# Patient Record
Sex: FEMALE | Race: AMERICAN INDIAN OR ALASKA NATIVE
[De-identification: names, ages, dates, MRNs, and addresses within clinical notes are randomized per-mention and may not be internally consistent; named-entity substitution may affect disease eponyms.]

---

## 2017-07-18 NOTE — ED ELOPEMENT REVIEW
ED Pt Elopement review





- Results review


Lab results: 





 Laboratory Tests











  07/16/17 07/16/17 07/16/17





  17:19 17:19 17:20


 


WBC  8.1  


 


RBC  3.51 L  


 


Hgb  11.9  


 


Hct  34.4  


 


MCV  98 H  


 


MCH  34 H  


 


MCHC  35 H  


 


RDW  14.1  


 


Plt Count  203  


 


Lymph % (Auto)  29.4  


 


Mono % (Auto)  10.2 H  


 


Eos % (Auto)  1.7  


 


Baso % (Auto)  0.3  


 


Lymph #  2.4  


 


Mono #  0.8  


 


Eos #  0.1  


 


Baso #  0.0  


 


Seg Neutrophils %  58.4  


 


Seg Neutrophils #  4.7  


 


HCG, Quant   61394 H 


 


Urine Color   


 


Urine Turbidity   


 


Urine pH   


 


Ur Specific Gravity   


 


Urine Protein   


 


Urine Glucose (UA)   


 


Urine Ketones   


 


Urine Blood   


 


Urine Nitrite   


 


Urine Bilirubin   


 


Urine Urobilinogen   


 


Ur Leukocyte Esterase   


 


Urine WBC (Auto)   


 


Urine RBC (Auto)   


 


Blood Type    A POSITIVE


 


Antibody Screen    TNR


 


KISHA Antibody Screen    Negative














  07/16/17





  17:49


 


WBC 


 


RBC 


 


Hgb 


 


Hct 


 


MCV 


 


MCH 


 


MCHC 


 


RDW 


 


Plt Count 


 


Lymph % (Auto) 


 


Mono % (Auto) 


 


Eos % (Auto) 


 


Baso % (Auto) 


 


Lymph # 


 


Mono # 


 


Eos # 


 


Baso # 


 


Seg Neutrophils % 


 


Seg Neutrophils # 


 


HCG, Quant 


 


Urine Color  Yellow


 


Urine Turbidity  Clear


 


Urine pH  5.0


 


Ur Specific Gravity  1.024


 


Urine Protein  <15 mg/dl


 


Urine Glucose (UA)  Neg


 


Urine Ketones  Neg


 


Urine Blood  Neg


 


Urine Nitrite  Neg


 


Urine Bilirubin  Neg


 


Urine Urobilinogen  < 2.0


 


Ur Leukocyte Esterase  Neg


 


Urine WBC (Auto)  0.0


 


Urine RBC (Auto)  0.0


 


Blood Type 


 


Antibody Screen 


 


KISHA Antibody Screen 














- Call Back decision


Pt Call Back Decision: Call pt to return to ED ASAP (pregnancy, vaginal bleeding

, hypotension and abdominal pain should be further evaluated.  Consider pelvic 

ultrasound.)

## 2017-08-10 NOTE — ULTRASOUND REPORT
COMPLETE OB ULTRASOUND:



Gestation: Hernandez



Fetal Position: Breech



Amniotic Fluid: Normal for gestation

  

Placenta: Anterior

  Placental Grade: 0



Fetal Heart Rate:

  149 BPM



Cervical length: 2.2 cm (Normal > 3 cm)



X It is too early for a fetal anatomical survey



BPD: 3.9 cm = 17 w 5 d



 HC: 14.7 cm = 17 w 6 d



 AC: 13.3 cm = 18 w 5 d



 FL: 2.5 cm = 17 w 3 d



HC/AC Ratio: 1.11



Cephalic Index: 84.6



Estimated Fetal Weight: 223 grams



Clinical age = 18 w 2 d      EDC: 1/9/18



US Gest. Age = 18 w 0 d      EDC: 1/11/18

## 2017-08-10 NOTE — EMERGENCY DEPARTMENT REPORT
HPI





- General


Chief Complaint: Vaginal Bleeding


Time Seen by Provider: 08/10/17 12:40





- HPI


HPI: 


This is a 22-year-old  female presents to the emergency 

department with complaint of a small amount of vaginal bleeding/spotting that 

began last night while the patient is pregnant at about 18 weeks.  She is .  She has an OB/GYN but cannot member the name at this time.  She is taking 

prenatal vitamins.  She has some intermittent lower abdominal cramping or 

discomfort.  She also complains of increased fatigue.  She denies any chest pain

, shortness breath, fever, dysuria.  She has been taking Tylenol for her 

symptoms without any relief.  No recent travel or sick contacts at home.  She 

has a past psychiatric history of bipolar disorder and depression.








ED Past Medical Hx





- Past Medical History


Hx Psychiatric Treatment: Yes (bipolar, depression)





- Surgical History


Additional Surgical History: D & C





- Social History


Smoking Status: Former Smoker


Substance Use Type: None





- Medications


Home Medications: 


 Home Medications











 Medication  Instructions  Recorded  Confirmed  Last Taken  Type


 


No Known Home Medications [No  03/03/16 08/10/17 Unknown History





Reported Home Medications]     














ED Review of Systems


ROS: 


Stated complaint: PREG/4MONTH/FEELING SICK/SPOTTING


Other details as noted in HPI





Comment: All other systems reviewed and negative


Constitutional: other (fatigue).  denies: chills, fever


Eyes: denies: eye pain, eye discharge, vision change


ENT: denies: ear pain, throat pain


Respiratory: denies: cough, shortness of breath, wheezing


Cardiovascular: denies: chest pain, palpitations


Gastrointestinal: abdominal pain, nausea, vomiting (this morning)


Genitourinary: other (vaginal bleeding).  denies: dysuria


Musculoskeletal: denies: back pain, joint swelling, arthralgia


Skin: denies: rash, lesions


Neurological: denies: weakness, numbness, paresthesias





Physical Exam





- Physical Exam


Vital Signs: 


 Vital Signs











  08/10/17 08/10/17 08/10/17





  10:48 12:52 13:14


 


Temperature 97.8 F  


 


Pulse Rate 79 78 


 


Respiratory 17 16 18





Rate   


 


Blood Pressure 117/64  


 


Blood Pressure  104/62 





[Right]   


 


O2 Sat by Pulse 100 100 





Oximetry   











Physical Exam: 





GENERAL: The patient is well-developed well-nourished.


HEENT: Normocephalic.  Atraumatic.  Extraocular motions are intact.  Patient 

has moist mucous membranes.  Pupils equal reactive to light bilaterally.


NECK: Supple.  Trachea is mid line.


CHEST/LUNGS: Clear to auscultation.  There is no respiratory distress noted.


HEART/CARDIOVASCULAR: Regular.  There is no tachycardia.  There is no gallop 

rub or murmur.


ABDOMEN: Abdomen is soft, nontender.  Patient has normal bowel sounds.  There 

is no abdominal distention.  Gravid uterus is palpable just below the umbilicus.


SKIN: Skin is warm and dry.


NEURO: The patient is awake, alert, and oriented.  The patient is cooperative.  

The patient has no focal neurologic deficits.  The patient has normal speech.


MUSCULOSKELETAL: There is no tenderness or deformity.  There is no limitation 

range of motion.  There is no evidence of acute injury.





ED Course


 Vital Signs











  08/10/17 08/10/17 08/10/17





  10:48 12:52 13:14


 


Temperature 97.8 F  


 


Pulse Rate 79 78 


 


Respiratory 17 16 18





Rate   


 


Blood Pressure 117/64  


 


Blood Pressure  104/62 





[Right]   


 


O2 Sat by Pulse 100 100 





Oximetry   














ED Medical Decision Making





- Lab Data


Result diagrams: 


 08/10/17 11:29





 08/10/17 14:56





- EKG Data


-: EKG Interpreted by Me


EKG shows normal: sinus rhythm, axis, intervals, QRS complexes, ST-T waves


Rate: normal





- EKG Data


When compared to previous EKG there are: previous EKG unavailable


Interpretation: normal EKG





- Radiology Data


Radiology results: report reviewed


Live intrauterine pregnancy at about 18 weeks on obstetric ultrasound.








- Medical Decision Making


22-year-old female presents to the emergency department with complaint of some 

light vaginal spotting, lower abdominal cramping and some just generalized 

fatigue.  Ultrasound shows live intrauterine pregnancy at about 18 weeks.  

Patient understands the diagnosis of threatened miscarriage and the importance 

of following up with her primary care physician and OB/GYN.  This her labs are 

unremarkable do not show any etiology of the patient's increased fatigue.  EKG 

is normal without ST elevation MI, ischemia or dysrhythmia.  Vital signs stable 

throughout her ED course.








- Differential Diagnosis


pregnancy, threatened miscarriage, spontaneous miscarriage, anemia


Critical Care Time: No


Critical care attestation.: 


If time is entered above; I have spent that time in minutes in the direct care 

of this critically ill patient, excluding procedure time.








ED Disposition


Clinical Impression: 


 Threatened , Hypokalemia





Pregnancy


Qualifiers:


 Weeks of gestation: 18 weeks Qualified Code(s): Z3A.18 - 18 weeks gestation of 

pregnancy





Disposition: DC-01 TO HOME OR SELFCARE


Is pt being admited?: No


Condition: Stable


Instructions:  Threatened Miscarriage (ED), Pregnancy (ED)


Additional Instructions: 


Please follow-up with your primary care physician and OB/GYN in the next few 

days.  Return to the emergency department with any worsening of your vaginal 

bleeding, abdominal pain, or any acute distress.


Referrals: 


PRIMARY CARE,MD [Primary Care Provider] - 3-5 Days


Time of Disposition: 16:41

## 2017-12-05 NOTE — ULTRASOUND REPORT
FINAL REPORT



PROCEDURE:  US OB FETAL BPP WO NON-STRESS



TECHNIQUE:  Sonographic evaluation for fetal breathing, fetal

movement, fetal tone, and amniotic fluid volume was performed.

CPT 38205



HISTORY:  decreased fetal movement 



COMPARISON:  No prior studies are available for comparison.



FINDINGS:  

Single live intrauterine pregnancy is seen with heart rate of 153

beats per minute. 



Amniotic fluid volume: Normal-score 2. At least one vertical

pocket &gt; 2 cm or more in vertical axis.



Fetal breathing: Normal-score 2.



Fetal movement: Normal-score 2.



Fetal tone: Normal.



Score: 8 of 8.



IMPRESSION:  

Normal biophysical profile.

## 2017-12-08 NOTE — HISTORY AND PHYSICAL REPORT
History of Present Illness


Date of examination: 17


Chief complaint: 





sent from [facility] for delivery


History of present illness: 





Pt is a 22 year old -American female primigravida MATY 18 at 35w3d 

presents for induction of labor secondary to IUGR (2nd%ile) and mild 

preeclampsia at greater than 34 weeks per [facility] recommendation. She reports 

decreased fetal movement, but denies vaginal bleeding or leakage of fluid. She 

has had prenatal care at [facility] since transfer into care at 22 

wks complicated by recently diagnosed IUGR and repeat complaints of decreased 

fetal movement. She is GBS unknown.  





Past History


Past Medical History: no pertinent history


Past Surgical History: no surgical history


Family/Genetic History: none, heart disease, hypertension, cancer


Social history: no significant social history, single





- Obstetrical History


Expected Date of Delivery: 18


Actual Gestation: 35 Week(s) 3 Day(s) 


: 1





Medications and Allergies


 Allergies











Allergy/AdvReac Type Severity Reaction Status Date / Time


 


No Known Allergies Allergy   Verified 08/10/17 10:57











 Home Medications











 Medication  Instructions  Recorded  Confirmed  Last Taken  Type


 


Pnv,Calcium 72/Iron/Folic Acid 1 each PO DAILY 17 09:00 

History





[Preplus Ca-Fe 27 mg-FA 1 mg Tb]    1 














Review of Systems


All systems: negative





- Vital Signs


Vital signs: 


 Vital Signs











Temp Resp


 


 98.4 F   18 


 


 17 15:00  17 15:00








 











Temp Pulse Resp BP Pulse Ox


 


 98.4 F   73   18   164/103    


 


 17 15:00  17 16:35  17 15:00  17 16:35   














- Physical Exam


Breasts: Positive: deferred


Cardiovascular: Regular rate


Lungs: Positive: Clear to auscultation


Abdomen: Positive: soft (obese, gravid )


Uterus: Positive: enlarged (gravid )


Extremities: Positive: edema (+1)





- Obstetrical


FHR: auscultation normal


Uterine Contraction Monitor Mode: External


Cervical Dilatation: 1 (per RN )


Uterine Contraction Pattern: Absent


Uterine Tone Measurement Phase: Resting





Results


Result Diagrams: 


 17 15:05





 17 15:05


 Abnormal lab results











  17 Range/Units





  15:05 15:05 15:05 


 


RBC  3.22 L    (3.65-5.03)  M/mm3


 


RDW  15.6 H    (13.2-15.2)  %


 


Lactate Dehydrogenase    250 H  ()  units/L


 


Urine WBC (Auto)   26.0 H   (0.0-6.0)  /HPF


 


U Epithel Cells (Auto)   34.0 H   (0-13.0)  /HPF








All other labs normal.








Assessment and Plan


A: IUP at 35w3d


     Mild Preeclampsia 


     IUGR (2nd percentile)


     GBS unknown





P: Per MFM recommendation, admit for induction of labor.


    PIH labs 


    Closely monitor maternal and fetal status.

## 2017-12-10 NOTE — ANESTHESIA CONSULTATION
Anesthesia Consult and Med Hx


Date of service: 12/10/17





- Airway


Anesthetic Teeth Evaluation: Good


ROM Head & Neck: Adequate


Mental/Hyoid Distance: Adequate


Mallampati Class: Class II


Intubation Access Assessment: Probably Good





- Pulmonary Exam


CTA: Yes





- Cardiac Exam


Cardiac Exam: RRR





- Pre-Operative Health Status


ASA Pre-Surgery Classification: ASA2


Proposed Anesthetic Plan: Epidural





- Pulmonary


Hx Asthma: No


COPD: No


Hx Pneumonia: No





- Cardiovascular System


Hx Hypertension: No





- Central Nervous System


Hx Seizures: No


Hx Psychiatric Problems: No





- Endocrine


Hx Renal Disease: No


Hx End Stage Renal Disease: No


Hx Hypothyroidism: No


Hx Hyperthyroidism: No





- Hematic


Hx Anemia: No


Hx Sickle Cell Disease: No





- Other Systems


Hx Alcohol Use: No

## 2017-12-10 NOTE — PROGRESS NOTE
Assessment and Plan





IUp at 35 weeks for induction. will try cytotec for ripening after patient 

showers and eats.





Subjective





- Subjective


Date of service: 12/09/17


Interval history: 





IUGR at 35 weeks. Patient had cervidil overnight with no change.


Patient reports: fetal movement normal





Objective





- Vital Signs


Vital Signs: 


 Vital Signs - 12hr











  12/10/17 12/10/17 12/10/17





  03:53 03:56 08:25


 


Temperature 98.1 F  98.5 F


 


Pulse Rate 67 62 68


 


Respiratory 20  14





Rate   


 


Blood Pressure  135/67 


 


Blood Pressure 135/67  157/85





[Right]   


 


O2 Sat by Pulse   





Oximetry   














  12/10/17 12/10/17 12/10/17





  08:27 08:29 09:07


 


Temperature   


 


Pulse Rate 66 65 73


 


Respiratory   





Rate   


 


Blood Pressure 145/84 157/85 152/87


 


Blood Pressure   





[Right]   


 


O2 Sat by Pulse   





Oximetry   














  12/10/17 12/10/17 12/10/17





  09:21 09:36 09:40


 


Temperature   


 


Pulse Rate 72 68 


 


Respiratory   18





Rate   


 


Blood Pressure 150/82 161/82 


 


Blood Pressure   





[Right]   


 


O2 Sat by Pulse   





Oximetry   














  12/10/17 12/10/17 12/10/17





  09:51 10:06 11:14


 


Temperature   


 


Pulse Rate 68 74 74


 


Respiratory   





Rate   


 


Blood Pressure 147/65 168/95 159/78


 


Blood Pressure   





[Right]   


 


O2 Sat by Pulse   





Oximetry   














  12/10/17 12/10/17 12/10/17





  11:21 11:36 11:51


 


Temperature   


 


Pulse Rate 83 72 76


 


Respiratory   





Rate   


 


Blood Pressure 151/89 153/75 161/89


 


Blood Pressure   





[Right]   


 


O2 Sat by Pulse   





Oximetry   














  12/10/17 12/10/17 12/10/17





  12:06 13:44 13:45


 


Temperature   


 


Pulse Rate 68 124 H 122 H


 


Respiratory   





Rate   


 


Blood Pressure 156/84  135/99


 


Blood Pressure   





[Right]   


 


O2 Sat by Pulse  96 





Oximetry   














  12/10/17 12/10/17 12/10/17





  13:49 13:52 13:54


 


Temperature   


 


Pulse Rate 92 H 95 H 88


 


Respiratory   





Rate   


 


Blood Pressure  137/68 145/78


 


Blood Pressure   





[Right]   


 


O2 Sat by Pulse 98  96





Oximetry   














  12/10/17 12/10/17 12/10/17





  13:59 14:04 14:20


 


Temperature   


 


Pulse Rate 92 H 101 H 109 H


 


Respiratory   





Rate   


 


Blood Pressure   


 


Blood Pressure   





[Right]   


 


O2 Sat by Pulse 97 97 98





Oximetry   














  12/10/17 12/10/17 12/10/17





  14:21 14:25 14:30


 


Temperature   


 


Pulse Rate 93 H 77 82


 


Respiratory   





Rate   


 


Blood Pressure 136/70  


 


Blood Pressure   





[Right]   


 


O2 Sat by Pulse  98 96





Oximetry   














  12/10/17 12/10/17 12/10/17





  14:35 14:36 14:40


 


Temperature   


 


Pulse Rate 95 H 94 H 83


 


Respiratory   





Rate   


 


Blood Pressure  122/67 


 


Blood Pressure   





[Right]   


 


O2 Sat by Pulse 97  97





Oximetry   














  12/10/17 12/10/17 12/10/17





  14:45 14:50 14:51


 


Temperature   


 


Pulse Rate 82 97 H 83


 


Respiratory   





Rate   


 


Blood Pressure   172/86


 


Blood Pressure   





[Right]   


 


O2 Sat by Pulse 97 96 





Oximetry   














  12/10/17





  14:53


 


Temperature 


 


Pulse Rate 93 H


 


Respiratory 





Rate 


 


Blood Pressure 


 


Blood Pressure 





[Right] 


 


O2 Sat by Pulse 93





Oximetry 














- Exam


Breasts: deferred


Cardiovascular: Regular rate, Normal S1


Lungs: Clear to auscultation, Normal air movement


Abdomen: Present: normal appearance, normal bowel sounds


Cervical Dilatation: 0.5


Cervical Effacement Percentage: 40


Fetal station: -3


Uterine Contraction Pattern: Absent





- Labs


Labs: 


 Abnormal Labs











  12/08/17 12/08/17 12/08/17





  15:05 15:05 15:05


 


RBC  3.22 L  


 


RDW  15.6 H  


 


Lactate Dehydrogenase    250 H


 


Urine WBC (Auto)   26.0 H 


 


U Epithel Cells (Auto)   34.0 H

## 2017-12-11 NOTE — PROGRESS NOTE
Assessment and Plan





A/P PPD#1 s/p 


baby in [facility] 


patient ambultating well


10.3-9.2


A+ no rhogam indicated 


/80s send PI work up 


UTI will treat with keflex 





Subjective





- Subjective


Date of service: 17


Principal diagnosis: 


Patient reports: appetite normal, voiding normally, pain well controlled, flatus

, ambulating normally


[place]: doing well, in NICU





Objective





- Vital Signs


Latest vital signs: 


 Vital Signs











  Temp Pulse Resp BP BP Pulse Ox


 


 17 04:22    18   


 


 12/10/17 23:17    18   


 


 12/10/17 23:15  97.5 F L  73  20   143/67 


 


 12/10/17 17:15  98.4 F  88    148/84 


 


 12/10/17 16:21   77   135/77  


 


 12/10/17 16:06   82   127/83  


 


 12/10/17 15:51   78   147/77  


 


 12/10/17 15:35   91 H   143/84  


 


 12/10/17 15:31   92 H   141/80  


 


 12/10/17 15:22   89     97


 


 12/10/17 15:17   98 H     98


 


 12/10/17 15:00       87


 


 12/10/17 14:55   109 H     98


 


 12/10/17 14:53   93 H     93


 


 12/10/17 14:51   83   172/86  


 


 12/10/17 14:50   97 H     96


 


 12/10/17 14:45   82     97


 


 12/10/17 14:40   83     97


 


 12/10/17 14:36   94 H   122/67  


 


 12/10/17 14:35   95 H     97


 


 12/10/17 14:30   82     96


 


 12/10/17 14:25   77     98


 


 12/10/17 14:21   93 H   136/70  


 


 12/10/17 14:20   109 H     98


 


 12/10/17 14:04   101 H     97


 


 12/10/17 13:59   92 H     97


 


 12/10/17 13:54   88   145/78   96


 


 12/10/17 13:52   95 H   137/68  


 


 12/10/17 13:49   92 H     98


 


 12/10/17 13:45   122 H   135/99  


 


 12/10/17 13:44   124 H     96


 


 12/10/17 12:06   68   156/84  


 


 12/10/17 11:51   76   161/89  


 


 12/10/17 11:36   72   153/75  


 


 12/10/17 11:21   83   151/89  


 


 12/10/17 11:14   74   159/78  


 


 12/10/17 10:06   74   168/95  


 


 12/10/17 09:51   68   147/65  


 


 12/10/17 09:40    18   


 


 12/10/17 09:36   68   161/82  


 


 12/10/17 09:21   72   150/82  


 


 12/10/17 09:07   73   152/87  








 Intake and Output











 12/10/17 12/11/17 12/11/17





 23:59 07:59 15:59


 


Intake Total 400 440 


 


Output Total 500 1500 


 


Balance -100 -1060 


 


Intake:   


 


  Oral 400 440 


 


Output:   


 


  Urine 500 1500 


 


    Void 500 1500 


 


Other:   


 


  Total, Intake Amount 200 240 


 


  Total, Output Amount 500 600 


 


  # Voids   


 


    Void 1  














- Exam


Breasts: Present: normal


Cardiovascular: Present: Regular rate, Normal S1


Lungs: Present: Clear to auscultation, Normal air movement


Abdomen: Present: normal appearance, soft, normal bowel sounds.  Absent: 

distention, tenderness, guarding


Vulva: both: normal


Uterus: Present: normal, firm, fundal height below umbilicus.  Absent: bogginess

, tenderness


Extremities: Present: normal


Deep Tendon Reflex Grade: Normal +2





- Labs


Labs: 


 Abnormal lab results











  17 Range/Units





  05:17 


 


Hgb  9.2 L  (10.1-14.3)  gm/dl


 


Hct  27.2 L  (30.3-42.9)  %

## 2017-12-11 NOTE — EVENT NOTE
Date: 12/11/17





Patient was examined and labs reviewed. it appears patient had UTI will try 

keflex and BP still elevated. c/o headache will intitiate mag 4g loading and 

2gm will check levels Q6hrs. patient agrees with plan

## 2017-12-12 NOTE — PROGRESS NOTE
Assessment and Plan





- Patient Problems


(1) Elevated blood pressure reading


Current Visit: Yes   Status: Acute   


Plan to address problem: 


monitor symptoms today


consider discharge home later today if improved








Subjective





- Subjective


Date of service: 17


Principal diagnosis: 


Interval history: 


Patient has completed 24 hours magnesium for elevated blood pressures. She 

complains of a headache today. Blood pressures normotensive. 





Patient reports: appetite normal, voiding normally


[place]: in NICU





Objective





- Vital Signs


Latest vital signs: 


 Vital Signs











  Temp Pulse Resp BP BP Pulse Ox


 


 17 06:00  98.6 F  71  16   122/77 


 


 17 05:10    18   


 


 17 04:00  98.6 F  72  16   121/78 


 


 17 02:00  98.7 F  69  16   131/79 


 


 17 00:46    18   


 


 17 23:37    18   


 


 17 23:15  98.6 F  71  16   120/75 


 


 17 22:37    18   


 


 17 22:24   69   130/81  


 


 17 20:00  98.6 F  69  16   130/81 


 


 17 18:00  97.9 F  81  20   140/84  95


 


 17 16:05  98.1 F  86  20  157/103  157/103 


 


 17 14:02  97.6 F  84  20   166/100  96


 


 17 12:02  97.6 F  75  18   165/102 


 


 17 10:30   100 H    155/87 


 


 17 10:05  98.2 F  98 H    160/100 


 


 17 10:00  98.4 F  98 H    162/100 


 


 17 09:55  98.2 F  99 H    160/100 


 


 17 09:50  98.4 F  100 H    160/106 


 


 17 09:45  98.1 F  90    166/103 


 


 17 09:40  98.2 F  90    152/86 








 Intake and Output











 17





 22:59 06:59 14:59


 


Intake Total 780  


 


Output Total 4100 3400 


 


Balance -3320 -3400 


 


Intake:   


 


  Oral 480  


 


  Intake, Free Water 300  


 


Output:   


 


  Urine 4100 3400 


 


    Indwelling Catheter 4100 3400 


 


Other:   


 


  Total, Intake Amount 480  


 


  Total, Output Amount 900 1400 


 


  Voiding Method Indwelling Catheter  














- Exam


Abdomen: Present: normal appearance


Uterus: Present: normal, firm





- Labs


Labs: 


 Abnormal lab results











  17 Range/Units





  14:39 14:39 14:39 


 


RBC  2.99 L    (3.65-5.03)  M/mm3


 


Hgb  9.7 L    (10.1-14.3)  gm/dl


 


Hct  28.3 L    (30.3-42.9)  %


 


RDW  15.5 H    (13.2-15.2)  %


 


Magnesium    4.30 H  (1.7-2.3)  mg/dL


 


Lactate Dehydrogenase   244 H   ()  units/L














  17 Range/Units





  23:19 05:05 


 


RBC    (3.65-5.03)  M/mm3


 


Hgb    (10.1-14.3)  gm/dl


 


Hct    (30.3-42.9)  %


 


RDW    (13.2-15.2)  %


 


Magnesium  5.60 H  6.00 H  (1.7-2.3)  mg/dL


 


Lactate Dehydrogenase    ()  units/L

## 2017-12-13 NOTE — PROGRESS NOTE
Assessment and Plan


A: PPD #3 s/p  at 34 wks, Severe Preeclampsia s/p Magnesium Sulfate x 24 hrs

, Persistent Headache 





P: Head CT


    Trial of Fioricet


    Monitor clinical status 


   








Subjective





- Subjective


Date of service: 17


Principal diagnosis: 


Interval history: 





Pt continues to complain of headache and had labile blood pressures. Baby still 

in NICU.   


Patient reports: appetite normal, voiding normally, pain well controlled, 

ambulating normally


[place]: in NICU





Objective





- Vital Signs


Latest vital signs: 


 Vital Signs











  Temp Pulse Resp BP BP Pulse Ox


 


 17 10:23   80   152/98  


 


 17 08:41  98.7 F  73  20   170/104  98


 


 17 05:15    18   


 


 17 04:30  98.1 F  80  20   141/90 


 


 17 02:10  98.0 F  76  20   146/93 


 


 17 00:00  98.2 F  84  18   148/90 


 


 17 22:20  98.1 F  76  20  147/80  143/86 


 


 17 20:20  98.2 F  81  18   147/80 


 


 17 18:30  98.5 F  78  18   159/96 








 Intake and Output











 17





 22:59 06:59 14:59


 


Intake Total 480 120 


 


Balance 480 120 


 


Intake:   


 


  Oral 480 120 


 


Other:   


 


  Total, Intake Amount 240 120 


 


  # Voids   


 


    Void 1 1 














- Exam


Breasts: Present: deferred


Cardiovascular: Present: Regular rate


Lungs: Present: Clear to auscultation


Abdomen: Present: soft


Uterus: Present: fundal height below umbilicus


Extremities: Present: normal.  Absent: tenderness

## 2017-12-13 NOTE — CAT SCAN REPORT
CT scan of head without IV contrast:



History: Preeclampsia headache.



Findings:



Ventricles are normal in size and midline in location. No evidence of 

acute ischemia, hemorrhage or mass. No extra-axial fluid collection. 

Normal brainstem and cerebellum.



Impression:



No acute intracranial abnormality.

## 2017-12-14 NOTE — PROGRESS NOTE
Assessment and Plan





A: PPD #4 s/p  at 34 wks, Severe Preeclampsia s/p magnesium sulfate, 

headache 


P: Discharge today with follow up in 1 week for blood pressure check.  





Subjective





- Subjective


Date of service: 17


Principal diagnosis: Severe Preeclampsia, s/p  delivery 


Interval history: 





Pt continues to complain of headache and had labile blood pressures. Baby still 

in NICU.   Normal head CT. 


Patient reports: appetite normal, voiding normally, other (mild headache )


[place]: in NICU





Objective





- Vital Signs


Latest vital signs: 


 Vital Signs











  Temp Pulse Resp BP BP BP


 


 17 05:20  98.5 F  69  18   139/85 


 


 17 01:35  98.2 F  68  20   139/85 


 


 17 22:57  98.4 F  75  18   148/84 


 


 17 22:55   75   148/84  


 


 17 15:55  97.9 F  88  20    92/59


 


 17 10:23   80   152/98  


 


 17 10:00  98.1 F  94 H  20    146/74








 Intake and Output











 17





 22:59 06:59 14:59


 


Intake Total 480 300 


 


Balance 480 300 


 


Intake:   


 


  Oral 240  


 


  Intake, [place] 240 300 


 


Other:   


 


  Total, Intake Amount 240  


 


  Voiding Method Toilet  


 


  # Voids   


 


    Void 1 1 














- Exam


Breasts: Present: deferred


Cardiovascular: Present: Regular rate


Lungs: Present: Clear to auscultation


Abdomen: Present: soft


Uterus: Present: fundal height below umbilicus


Extremities: Present: normal

## 2017-12-14 NOTE — DISCHARGE SUMMARY
Providers





- Providers


Date of Admission: 


17 15:16





Date of discharge: 17


Attending physician: 


MARGARITO GAN





Primary care physician: 


MARGARITO GAN








Hospitalization


Reason for admission: other (severe preeclampsia, IUGR )


Delivery: 


Procedure details: 





Please see delivery note. 


Episiotomy: none


Laceration: none


Other postpartum procedures: none


Postpartum complications: other (headache)


Discharge diagnosis: other (severe preeclampsia ),  delivery


 baby: female


Hospital course: 





Pt was admitted for induction of labor secondary to IUGR and Preeclampsia. She 

ultimately had a vaginal delivery which she tolerated well. She did receive 

magnesium sulfate for seizure prophylaxis. Her postpartum course was 

complicated by labile blood pressures and headache. She had a head CT scan that 

was normal. Her headache was improved and she met discharge criteria on PPD#4. 

She will follow up in 1  week for a blood pressure check. 


Condition at discharge: Stable


Disposition: - TO HOME OR SELFCARE





- Discharge Diagnoses


(1)  delivery


Status: Acute   





(2) Pre-eclampsia affecting childbirth


Status: Acute   





(3) IUGR (intrauterine growth restriction)


Status: Acute   





(4) Anemia affecting pregnancy


Status: Acute   


Qualifiers: 


   Trimester: third trimester   Qualified Code(s): O99.013 - Anemia 

complicating pregnancy, third trimester   





Plan





- Discharge Medications


Prescriptions: 


Butalb/Acetamin/Caff -40 [Fioricet] 1 tab PO Q6HR PRN #30 tab


 PRN Reason: Headache


HYDROcodone/ACETAMINOPHEN [Norco 5-325 Tablet] 1 each PO Q8H PRN #30 tablet


 PRN Reason: Pain


Ibuprofen [Motrin] 800 mg PO Q8HR PRN #60 tablet


 PRN Reason: Pain


Labetalol [Normodyne TAB] 400 mg PO BID #120 tablet





- Provider Discharge Summary


Activity: routine, no sex for 6 weeks, no heavy lifting 4 weeks, no strenuous 

exercise


Diet: routine


Instructions: routine


Additional instructions: 


[]  Smoking cessation referral if applicable(refer to patient education folder 

for contact #)


[]  Refer to [facility] Booklet








Call your doctor immediately for:


* Fever > 100.5


* Heavy vaginal bleeding ( >1 pad per hour)


* Severe persistent headache


* Shortness of breath


* Reddened, hot, painful area to leg or breast


* Drainage or odor from incision.





* Keep incision clean and dry at all times and follow doctor's instructions 

regarding bathing/showering











- Follow up plan


Follow up: 


MARGARITO GAN MD [Primary Care Provider] - 7 Days


Forms:  [facility] Discharge Summary

## 2019-11-13 ENCOUNTER — HOSPITAL ENCOUNTER (OUTPATIENT)
Dept: HOSPITAL 5 - TRG | Age: 24
Discharge: HOME | End: 2019-11-13
Attending: OBSTETRICS & GYNECOLOGY
Payer: MEDICAID

## 2019-11-13 VITALS — SYSTOLIC BLOOD PRESSURE: 98 MMHG | DIASTOLIC BLOOD PRESSURE: 57 MMHG

## 2019-11-13 DIAGNOSIS — Z3A.32: ICD-10-CM

## 2019-11-13 DIAGNOSIS — Z34.83: Primary | ICD-10-CM

## 2019-11-13 LAB
BACTERIA #/AREA URNS HPF: (no result) /HPF
BILIRUB UR QL STRIP: (no result)
BLOOD UR QL VISUAL: (no result)
MUCOUS THREADS #/AREA URNS HPF: (no result) /HPF
PH UR STRIP: 7 [PH] (ref 5–7)
PROT UR STRIP-MCNC: (no result) MG/DL
RBC #/AREA URNS HPF: 3 /HPF (ref 0–6)
UROBILINOGEN UR-MCNC: < 2 MG/DL (ref ?–2)
WBC #/AREA URNS HPF: 2 /HPF (ref 0–6)

## 2019-11-13 PROCEDURE — 59025 FETAL NON-STRESS TEST: CPT

## 2019-11-13 PROCEDURE — 81001 URINALYSIS AUTO W/SCOPE: CPT

## 2019-11-25 ENCOUNTER — HOSPITAL ENCOUNTER (OUTPATIENT)
Dept: HOSPITAL 5 - TRG | Age: 24
Discharge: HOME | End: 2019-11-25
Attending: OBSTETRICS & GYNECOLOGY
Payer: MEDICAID

## 2019-11-25 VITALS — DIASTOLIC BLOOD PRESSURE: 67 MMHG | SYSTOLIC BLOOD PRESSURE: 112 MMHG

## 2019-11-25 DIAGNOSIS — O47.03: Primary | ICD-10-CM

## 2019-11-25 DIAGNOSIS — Z3A.33: ICD-10-CM

## 2019-11-25 PROCEDURE — 76819 FETAL BIOPHYS PROFIL W/O NST: CPT

## 2019-11-25 PROCEDURE — 76815 OB US LIMITED FETUS(S): CPT

## 2019-11-25 NOTE — ULTRASOUND REPORT
Limited OB ultrasound



INDICATION: Pregnant



FINDINGS: There is a single intrauterine pregnancy. Fetus is in a cephalic presentation. Amniotic flu
id index is 10.7 cm. Fetal heart rate is 165 bpm



IMPRESSION: Amniotic fluid index is within normal limits.











Biophysical profile



INDICATION: 

Pregnant



COMPARISON:

None



FINDINGS:



Fetal breathing movement: 2/2



Fetal movement: 2/2



Fetal posture and tone: 2/2



Qualitative amniotic fluid volume: 2/2



IMPRESSION:



Total score for biophysical profile is 8/8



Fetal heart rate is 160 bpm



Signer Name: Osito Chavarria MD 

Signed: 11/25/2019 6:11 PM

 Workstation Name: [id number]

## 2019-12-09 ENCOUNTER — HOSPITAL ENCOUNTER (OUTPATIENT)
Dept: HOSPITAL 5 - TRG | Age: 24
Discharge: HOME | End: 2019-12-09
Attending: OBSTETRICS & GYNECOLOGY
Payer: MEDICAID

## 2019-12-09 VITALS — DIASTOLIC BLOOD PRESSURE: 65 MMHG | SYSTOLIC BLOOD PRESSURE: 106 MMHG

## 2019-12-09 DIAGNOSIS — Z3A.35: ICD-10-CM

## 2019-12-09 DIAGNOSIS — O47.03: Primary | ICD-10-CM

## 2019-12-09 LAB
BACTERIA #/AREA URNS HPF: (no result) /HPF
BILIRUB UR QL STRIP: (no result)
BLOOD UR QL VISUAL: (no result)
MUCOUS THREADS #/AREA URNS HPF: (no result) /HPF
PH UR STRIP: 7 [PH] (ref 5–7)
PROT UR STRIP-MCNC: (no result) MG/DL
RBC #/AREA URNS HPF: 1 /HPF (ref 0–6)
UROBILINOGEN UR-MCNC: < 2 MG/DL (ref ?–2)
WBC #/AREA URNS HPF: 1 /HPF (ref 0–6)

## 2019-12-09 PROCEDURE — 81001 URINALYSIS AUTO W/SCOPE: CPT

## 2019-12-09 PROCEDURE — 59025 FETAL NON-STRESS TEST: CPT

## 2019-12-23 ENCOUNTER — HOSPITAL ENCOUNTER (INPATIENT)
Dept: HOSPITAL 5 - TRG | Age: 24
LOS: 3 days | Discharge: HOME | End: 2019-12-26
Attending: OBSTETRICS & GYNECOLOGY | Admitting: OBSTETRICS & GYNECOLOGY
Payer: MEDICAID

## 2019-12-23 DIAGNOSIS — D64.9: ICD-10-CM

## 2019-12-23 DIAGNOSIS — Z3A.37: ICD-10-CM

## 2019-12-23 LAB
HCT VFR BLD CALC: 29.7 % (ref 30.3–42.9)
HGB BLD-MCNC: 9.8 GM/DL (ref 10.1–14.3)
MCHC RBC AUTO-ENTMCNC: 33 % (ref 30–34)
MCV RBC AUTO: 90 FL (ref 79–97)
PLATELET # BLD: 287 K/MM3 (ref 140–440)
RBC # BLD AUTO: 3.3 M/MM3 (ref 3.65–5.03)

## 2019-12-23 PROCEDURE — 76815 OB US LIMITED FETUS(S): CPT

## 2019-12-23 PROCEDURE — 85014 HEMATOCRIT: CPT

## 2019-12-23 PROCEDURE — 86850 RBC ANTIBODY SCREEN: CPT

## 2019-12-23 PROCEDURE — 90715 TDAP VACCINE 7 YRS/> IM: CPT

## 2019-12-23 PROCEDURE — 85027 COMPLETE CBC AUTOMATED: CPT

## 2019-12-23 PROCEDURE — 86901 BLOOD TYPING SEROLOGIC RH(D): CPT

## 2019-12-23 PROCEDURE — 36415 COLL VENOUS BLD VENIPUNCTURE: CPT

## 2019-12-23 PROCEDURE — 90686 IIV4 VACC NO PRSV 0.5 ML IM: CPT

## 2019-12-23 PROCEDURE — 86900 BLOOD TYPING SEROLOGIC ABO: CPT

## 2019-12-23 PROCEDURE — 76819 FETAL BIOPHYS PROFIL W/O NST: CPT

## 2019-12-23 PROCEDURE — 85018 HEMOGLOBIN: CPT

## 2019-12-23 PROCEDURE — 90471 IMMUNIZATION ADMIN: CPT

## 2019-12-23 RX ADMIN — SODIUM CHLORIDE, SODIUM LACTATE, POTASSIUM CHLORIDE, AND CALCIUM CHLORIDE SCH MLS/HR: .6; .31; .03; .02 INJECTION, SOLUTION INTRAVENOUS at 23:28

## 2019-12-24 RX ADMIN — AMPICILLIN SODIUM SCH MLS/HR: 1 INJECTION, POWDER, FOR SOLUTION INTRAMUSCULAR; INTRAVENOUS at 09:34

## 2019-12-24 RX ADMIN — IBUPROFEN SCH MG: 600 TABLET, FILM COATED ORAL at 18:42

## 2019-12-24 RX ADMIN — FERROUS SULFATE TAB 325 MG (65 MG ELEMENTAL FE) SCH MG: 325 (65 FE) TAB at 22:52

## 2019-12-24 RX ADMIN — SODIUM CHLORIDE, SODIUM LACTATE, POTASSIUM CHLORIDE, AND CALCIUM CHLORIDE SCH MLS/HR: .6; .31; .03; .02 INJECTION, SOLUTION INTRAVENOUS at 08:41

## 2019-12-24 RX ADMIN — BUTORPHANOL TARTRATE PRN MG: 2 INJECTION, SOLUTION INTRAMUSCULAR; INTRAVENOUS at 04:03

## 2019-12-24 RX ADMIN — BUTORPHANOL TARTRATE PRN MG: 2 INJECTION, SOLUTION INTRAMUSCULAR; INTRAVENOUS at 11:37

## 2019-12-24 RX ADMIN — HYDROCODONE BITARTRATE AND ACETAMINOPHEN PRN EACH: 5; 325 TABLET ORAL at 22:52

## 2019-12-24 RX ADMIN — AMPICILLIN SODIUM SCH MLS/HR: 1 INJECTION, POWDER, FOR SOLUTION INTRAMUSCULAR; INTRAVENOUS at 05:26

## 2019-12-24 NOTE — HISTORY AND PHYSICAL REPORT
History of Present Illness


Date of examination: 19


Date of admission: 


19 22:53





Chief complaint: 





contractions 


History of present illness: 





Pt is a 24 year old -American female  MATY 20 at 37w6d who 

presents with irregular contractions and 4 cm dilation. She denies vaginal 

bleeding or leakage of fluid. She has had prenatal care at [facility] since 10 wks complicated by h/o preeclampsia, h/o PTD at 35 wks, anemia, 

mild fetal pericardial effusion. She is GBS positive. 





Past History


Past Medical History: no pertinent history


Past Surgical History: D&C (treated with negative test of cure )


GYN History: chlamydia (treated with negative test of cure ), trichomonas 

(treated wtih)


Family/Genetic History: hypertension, cancer


Social history: no significant social history





- Obstetrical History


Expected Date of Delivery: 20


Actual Gestation: 37 Week(s) 6 Day(s) 


: 7


Para: 1


Hx # Term Pregnancies: 0


Number of  Pregnancies: 1


Spontaneous Abortions: 0


Induced : 5


Number of Living Children: 1





Medications and Allergies


                                    Allergies











Allergy/AdvReac Type Severity Reaction Status Date / Time


 


No Known Allergies Allergy   Verified 19 19:28











                                Home Medications











 Medication  Instructions  Recorded  Confirmed  Last Taken  Type


 


Prenatal Vit-Fe Fumar-FA [Prenatal 1 tab PO DAILY 19 

History





Vitamin]     











Active Meds: 


Active Medications





Butorphanol Tartrate (Stadol)  2 mg IV Q2H PRN


   PRN Reason: Pain , Severe (7-10)


   Last Admin: 19 04:03 Dose:  2 mg


   Documented by: 


Ephedrine Sulfate (Ephedrine Sulfate)  10 mg IV Q2M PRN


   PRN Reason: Hypotension


Oxytocin/Sodium Chloride (Pitocin/Ns 20 Unit/1000ml Drip)  20 units in 1,000 mls

@ 125 mls/hr IV AS DIRECT ANNETTE


Lactated Ringer's (Lactated Ringers)  1,000 mls @ 125 mls/hr IV AS DIRECT ANNETTE


   Last Admin: 19 08:41 Dose:  125 mls/hr


   Documented by: 


Ampicillin Sodium (Ampicillin/Ns 1 Gm/50 Ml)  1 gm in 50 mls @ 100 mls/hr IV 

Q4HR ANNETTE; Protocol


   Last Admin: 19 09:34 Dose:  100 mls/hr


   Documented by: 


Mineral Oil (Mineral Oil)  30 ml PO QHS PRN


   PRN Reason: Constipation


Terbutaline Sulfate (Brethine)  0.25 mg SUB-Q ONCE PRN


   PRN Reason: Hyperstimulation/Hypertonicity


Terbutaline Sulfate (Brethine)  0.25 mg IVP ONCE PRN


   PRN Reason: Hyperstimulation/Hypertonicity











Review of Systems


All systems: negative





- Vital Signs


Vital signs: 


                                   Vital Signs











Temp Pulse Resp BP


 


 98.0 F   85   18   108/58 


 


 19 20:00  19 20:00  19 20:00  19 20:00








                                        











Temp Pulse Resp BP Pulse Ox


 


 98.0 F   82   14   95/53    


 


 19 20:00  19 08:50  19 07:39  19 08:50   














- Physical Exam


Breasts: Positive: deferred


Abdomen: Positive: soft (gravid )


Uterus: Positive: enlarged (gravid )


Extremities: Positive: normal





- Obstetrical


FHR: auscultation normal


Cervical Dilatation: 4


Cervical Effacement Percentage: 90


Fetal station: -2


Uterine Contraction Pattern: Irregular


Uterine Tone Measurement Phase: Resting


Uterine Contraction Intensity: Mild





Results


Result Diagrams: 


                                 19 21:00





                              Abnormal lab results











  19 Range/Units





  21:00 


 


RBC  3.30 L  (3.65-5.03)  M/mm3


 


Hgb  9.8 L  (10.1-14.3)  gm/dl


 


Hct  29.7 L  (30.3-42.9)  %


 


RDW  18.0 H  (13.2-15.2)  %








All other labs normal.








Assessment and Plan





A: IUP at 37w6d s/p observation for 12 hours without cervical change 


    Latent labor 


    GBS positive 





P: BPP this morning


    If BPP 8/8 consider discharge home


    Continue to monitor maternal and fetal status

## 2019-12-24 NOTE — ULTRASOUND REPORT
ULTRASOUND FETAL BIOPHYSICAL PROFILE

ULTRASOUND OB LIMITED 



INDICATION:  IUP at 37 wks, fetal well being



TECHNIQUE: Transabdominal ultrasound imaging.



COMPARISON:  None



FINDINGS:



Fetal breathing movement = incomplete evaluation. Patient requested the exam to end 10 minutes before
 30 minute timeframe. Still waiting for breathing at the time of termination.

Gross body movement = 2

Fetal tone = 2

Qualitative amniotic fluid volume = 2



Total biophysical score = 6/8



Amniotic fluid index is 11.5 cm. 

Presentation is cephalic. 

Fetal heart rate is 143 beats per minute.



IMPRESSION:

Fetal biophysical profile equals 6/8. See above.

 



Signer Name: Celestino Trevino Jr, MD 

Signed: 12/24/2019 11:30 AM

 Workstation Name: [id number]

## 2019-12-24 NOTE — PROCEDURE NOTE
OB Delivery Note





- Delivery


Date of Delivery: 19


Surgeon: MARGARITO GAN


Estimated blood loss: other (400 mL)





- Vaginal


Delivery presentation: vertex


Delivery position: OA


Intrapartum events: precipitous labor- <3hr


Delivery induction: none


Delivery monitor: external FHT, external uterine


Route of delivery: 


Delivery placenta: spontaneous


Episiotomy: none


Delivery laceration: other (left labial- hemostatic without repair )


Anesthesia: intravenous





- Infant


  ** A


Apgar at 1 minute: 8


Apgar at 5 minutes: 9


Infant Gender: Male (2953g (6lb 8oz) @ 1155 am)

## 2019-12-24 NOTE — EVENT NOTE
Date: 12/24/19





Called by pt's RN with [facility]- clear fluid and cervix is 5 cm dilated. Proceed 

with labor augmentation. s/p 2 doses of ampicillin for GBS prophylaxis.

## 2019-12-24 NOTE — ULTRASOUND REPORT
ULTRASOUND FETAL BIOPHYSICAL PROFILE

ULTRASOUND OB LIMITED 



INDICATION:  IUP at 37 wks, fetal well being



TECHNIQUE: Transabdominal ultrasound imaging.



COMPARISON:  None



FINDINGS:



Fetal breathing movement = incomplete evaluation. Patient requested the exam to end 10 minutes before
 30 minute timeframe. Still waiting for breathing at the time of termination.

Gross body movement = 2

Fetal tone = 2

Qualitative amniotic fluid volume = 2



Total biophysical score = 6/8



Amniotic fluid index is 11.5 cm. 

Presentation is cephalic. 

Fetal heart rate is 143 beats per minute.



IMPRESSION:

Fetal biophysical profile equals 6/8. See above.

 



Signer Name: Celestino Trevino Jr, MD 

Signed: 12/24/2019 11:30 AM

 Workstation Name: [id number]

## 2019-12-25 LAB
HCT VFR BLD CALC: 24.9 % (ref 30.3–42.9)
HGB BLD-MCNC: 8.2 GM/DL (ref 10.1–14.3)

## 2019-12-25 RX ADMIN — FERROUS SULFATE TAB 325 MG (65 MG ELEMENTAL FE) SCH MG: 325 (65 FE) TAB at 22:37

## 2019-12-25 RX ADMIN — HYDROCODONE BITARTRATE AND ACETAMINOPHEN PRN EACH: 5; 325 TABLET ORAL at 20:32

## 2019-12-25 RX ADMIN — FERROUS SULFATE TAB 325 MG (65 MG ELEMENTAL FE) SCH MG: 325 (65 FE) TAB at 10:16

## 2019-12-25 RX ADMIN — IBUPROFEN SCH: 600 TABLET, FILM COATED ORAL at 00:24

## 2019-12-25 RX ADMIN — IBUPROFEN SCH MG: 600 TABLET, FILM COATED ORAL at 23:08

## 2019-12-25 RX ADMIN — IBUPROFEN SCH MG: 600 TABLET, FILM COATED ORAL at 16:38

## 2019-12-25 RX ADMIN — IBUPROFEN SCH MG: 600 TABLET, FILM COATED ORAL at 10:16

## 2019-12-25 RX ADMIN — IBUPROFEN SCH MG: 600 TABLET, FILM COATED ORAL at 06:32

## 2019-12-25 NOTE — PROGRESS NOTE
Assessment and Plan


A: PPD#1 s/p  at term, Asymptomatic anemia


P: Routine postpartum care. 





Subjective





- Subjective


Date of service: 19


Principal diagnosis: s/p  at term


Interval history: 





Pt is a 24 year old -American female  MATY 20 at 37w6d who 

presents with irregular contractions and 4 cm dilation. She denies vaginal 

bleeding or leakage of fluid. She has had prenatal care at [facility] since 10 wks complicated by h/o preeclampsia, h/o PTD at 35 wks, anemia, 

mild fetal pericardial effusion. She is GBS positive. 


Patient reports: appetite normal, voiding normally, pain well controlled


: doing well





Objective





- Vital Signs


Latest vital signs: 


                                   Vital Signs











  Temp Pulse Resp BP BP Pulse Ox


 


 19 06:32    18   


 


 19 01:35  98.4 F  75  20   108/68  97


 


 19 22:52    18   


 


 19 20:35  97.9 F  79  20   100/48  100


 


 19 16:28  98.2 F  83  20  113/53   99


 


 19 14:17      105/50 


 


 19 14:12  98.3 F     


 


 19 13:21   172 H     83 L


 


 19 13:14   75   105/59  


 


 19 12:57   86   112/64  


 


 19 12:53   86   109/63  


 


 19 12:49   84   107/67  


 


 19 12:45   78   110/57  


 


 19 12:39   83   112/55  


 


 19 12:30  98.5 F  80  20   113/81  98


 


 19 12:26   93 H   112/65  


 


 19 12:17   83   117/57  


 


 19 12:10   86   114/58  


 


 19 11:59   88   127/58  


 


 19 11:56   96 H   132/66  


 


 19 11:37    28 H   


 


 19 08:50   82    95/53 


 


 19 08:47   82   95/53  








                                Intake and Output











 19





 22:59 06:59 14:59


 


Intake Total 240 480 


 


Balance 240 480 


 


Intake:   


 


  Oral 240 480 


 


Other:   


 


  Total, Intake Amount 240 240 


 


  # Voids   


 


    Void 1 1 














- Exam


Breasts: Present: deferred


Cardiovascular: Present: Regular rate


Lungs: Present: Clear to auscultation


Abdomen: Present: soft


Uterus: Present: fundal height below umbilicus


Extremities: Present: normal





- Labs


Labs: 


                              Abnormal lab results











  19 Range/Units





  01:02 


 


Hgb  8.2 L  (10.1-14.3)  gm/dl


 


Hct  24.9 L  (30.3-42.9)  %

## 2019-12-26 VITALS — SYSTOLIC BLOOD PRESSURE: 108 MMHG | DIASTOLIC BLOOD PRESSURE: 67 MMHG

## 2019-12-26 RX ADMIN — FERROUS SULFATE TAB 325 MG (65 MG ELEMENTAL FE) SCH MG: 325 (65 FE) TAB at 10:35

## 2019-12-26 RX ADMIN — IBUPROFEN SCH MG: 600 TABLET, FILM COATED ORAL at 05:12

## 2019-12-26 RX ADMIN — IBUPROFEN SCH MG: 600 TABLET, FILM COATED ORAL at 10:35

## 2019-12-26 NOTE — PROGRESS NOTE
Assessment and Plan


PPD2 s/p 


Acute anemia- ferrous sulfate


Vital signs stable


Discharge to home today





Subjective





- Subjective


Date of service: 19


Principal diagnosis: s/p  at term


Interval history: 


PPD2 s/p 


Patient reports: appetite normal, voiding normally, pain well controlled, 

ambulating normally


[place]: doing well, nursing well





Objective





- Vital Signs


Latest vital signs: 


                                   Vital Signs











  Temp Pulse Resp BP BP Pulse Ox


 


 19 08:23  97.8 F  72  20  114/47   100


 


 19 23:50  97.8 F  68  20   106/58  100


 


 19 16:31  98.6 F  74  18  98/58   99








                                Intake and Output











 19





 23:59 07:59 15:59


 


Intake Total 240 480 240


 


Balance 240 480 240


 


Intake:   


 


  Oral 240 480 240


 


Other:   


 


  Total, Intake Amount 240 240 240


 


  # Voids   


 


    Void 1 1 














- Exam


Breasts: Present: breastfeeding


Lungs: Present: Normal air movement


Abdomen: Present: soft


Uterus: Present: firm, fundal height at umbilicus

## 2019-12-26 NOTE — DISCHARGE SUMMARY
Providers





- Providers


Date of Admission: 


19 22:53





Date of discharge: 19


Attending physician: 


EVELYN MONCADA





Primary care physician: 


EVELYN MONCADA








Hospitalization


Reason for admission: active labor, rupture of membranes, IUP at term


Delivery: 


Episiotomy: none


Laceration: other (left labial hemostatic without repaire)


Other postpartum procedures: none


Postpartum complications: none


Discharge diagnosis: IUP at term delivered


 baby: male


Hospital course: 


Uneventful postpartum course.


Condition at discharge: Good


Disposition: DC-01 TO HOME OR SELFCARE





Plan





- Discharge Medications


Prescriptions: 


Ferrous Sulfate [Feosol 325 MG tab] 325 mg PO BID #60 tablet


Ibuprofen [Motrin] 800 mg PO Q8HR PRN #30 tablet


 PRN Reason: Pain, Moderate (4-6)


HYDROcodone/APAP 5-325 [[place] 5/325] 1 each PO Q6HR PRN #20 tablet


 PRN Reason: Pain





- Provider Discharge Summary


Activity: routine, no sex for 6 weeks, no heavy lifting 4 weeks, no strenuous 

exercise


Diet: routine


Instructions: routine


Additional instructions: 


[]  Smoking cessation referral if applicable(refer to patient education folder 

for contact #)


[]  Refer to [facility] Booklet








Call your doctor immediately for:


* Fever > 100.5


* Heavy vaginal bleeding ( >1 pad per hour)


* Severe persistent headache


* Shortness of breath


* Reddened, hot, painful area to leg or breast


* Drainage or odor from incision.





* Keep incision clean and dry at all times and follow doctor's instructions 

regarding bathing/showering











- Follow up plan


Follow up: 


LYNDON HALE CNM [Advanced Practice Nurse] - 14 Days (Please call [facility] to schedule appt)